# Patient Record
Sex: FEMALE | Race: WHITE | NOT HISPANIC OR LATINO | Employment: OTHER | URBAN - METROPOLITAN AREA
[De-identification: names, ages, dates, MRNs, and addresses within clinical notes are randomized per-mention and may not be internally consistent; named-entity substitution may affect disease eponyms.]

---

## 2022-04-11 ENCOUNTER — TELEPHONE (OUTPATIENT)
Dept: UROLOGY | Facility: AMBULATORY SURGERY CENTER | Age: 42
End: 2022-04-11

## 2022-04-11 NOTE — TELEPHONE ENCOUNTER
Please Triage  New Patient    What is the reason for the patients appointment? Kidney stones- [address]    What office location does the patient prefer? [place]     Imaging/Lab Results:    Do we accept the patient's insurance or is the patient Self-Pay? [address]  Provider:  Plan Type/Number:  Member ID#: Has the patient had any previous Urologist(s)? no    Have patient records been requested? If not are records showing in Epic: pt will have records faxed over     Has the patient had any outside testing done? US in ED     Does the patient have a personal history of cancer?  No    Pt call [facility]-[phone]

## 2022-04-11 NOTE — TELEPHONE ENCOUNTER
Patient was seen in the [address] [place] [address] on 4/9/22  Patient states she was having difficulty with urinating  Patient states blood work was done, urine testing   No imaging studies were preformed expect bedside ultrasound  Lopez catheter was placed  Was advised by ED to follow up with Urology for voiding trial  Patient insurance only allows her to go [facility]  location for urologist or [address] [place] [address]  The urologist in her area does not take her insurance  Patient states she had an ultrasound of the bladder and had a catheter placed  Patient states she had been trying to get records from [facility] for recent  Patient is willing to travel to [address]   Patient is requesting records  Will have records faxed to our office  Will monitor for records

## 2022-04-11 NOTE — TELEPHONE ENCOUNTER
Returned call to patient   No answer  Left general message on voice mail to return call to our office

## 2022-04-12 ENCOUNTER — TELEPHONE (OUTPATIENT)
Dept: UROLOGY | Facility: AMBULATORY SURGERY CENTER | Age: 42
End: 2022-04-12

## 2022-04-12 ENCOUNTER — TELEPHONE (OUTPATIENT)
Dept: UROLOGY | Facility: MEDICAL CENTER | Age: 42
End: 2022-04-12

## 2022-04-12 NOTE — TELEPHONE ENCOUNTER
Received and faxed to central faxing to place in patients chart and emailed a copy to [facility]

## 2022-04-12 NOTE — TELEPHONE ENCOUNTER
Schedule AP visit either in [facility] and void trial-next available (this week if possible)

## 2022-04-12 NOTE — TELEPHONE ENCOUNTER
Patient called about getting an appointment   She would like to schedule an appointment for a voiding trial      Patient can be reached at [phone]

## 2022-04-12 NOTE — TELEPHONE ENCOUNTER
Conversation  [facility])    [facility]    4/12/22 9:33 AM  Note  Records from [facility], [facility], faxed to [facility] and also faxed to [facility]

## 2022-04-12 NOTE — TELEPHONE ENCOUNTER
Records from [facility], [facility], faxed to [facility] and also faxed to [facility]

## 2022-04-13 NOTE — TELEPHONE ENCOUNTER
Called and spoke with patient  Patient scheduled 04/15/22 with Dr Mirna Mckenzie at 9:30am in the [facility] office  Patient will then come to the [address] [place] [address] office at 2:30 pm for a PVR

## 2022-04-15 ENCOUNTER — OFFICE VISIT (OUTPATIENT)
Dept: UROLOGY | Facility: HOSPITAL | Age: 42
End: 2022-04-15
Payer: COMMERCIAL

## 2022-04-15 VITALS
SYSTOLIC BLOOD PRESSURE: 122 MMHG | BODY MASS INDEX: 22.02 KG/M2 | OXYGEN SATURATION: 99 % | HEART RATE: 85 BPM | WEIGHT: 129 LBS | DIASTOLIC BLOOD PRESSURE: 88 MMHG | HEIGHT: 64 IN

## 2022-04-15 DIAGNOSIS — R33.9 URINARY RETENTION WITH INCOMPLETE BLADDER EMPTYING: Primary | ICD-10-CM

## 2022-04-15 LAB — POST-VOID RESIDUAL VOLUME, ML POC: 25 ML

## 2022-04-15 PROCEDURE — 99202 OFFICE O/P NEW SF 15 MIN: CPT | Performed by: UROLOGY

## 2022-04-15 PROCEDURE — 51798 US URINE CAPACITY MEASURE: CPT

## 2022-04-15 RX ORDER — LIOTHYRONINE SODIUM 5 UG/1
10 TABLET ORAL DAILY
COMMUNITY
Start: 2022-02-10

## 2022-04-15 RX ORDER — DULOXETIN HYDROCHLORIDE 60 MG/1
60 CAPSULE, DELAYED RELEASE ORAL DAILY
COMMUNITY
Start: 2022-04-06

## 2022-04-15 RX ORDER — FLUDROCORTISONE ACETATE 0.1 MG/1
0.1 TABLET ORAL DAILY
COMMUNITY
Start: 2022-03-23

## 2022-04-15 RX ORDER — BUPROPION HYDROCHLORIDE 300 MG/1
300 TABLET ORAL DAILY
COMMUNITY
Start: 2022-02-04

## 2022-04-15 RX ORDER — LEVOTHYROXINE SODIUM 0.1 MG/1
TABLET ORAL
COMMUNITY
Start: 2022-03-24

## 2022-04-15 RX ORDER — DIAZEPAM 2 MG/1
2 TABLET ORAL 4 TIMES DAILY
COMMUNITY
Start: 2022-02-08

## 2022-04-15 RX ORDER — RIZATRIPTAN BENZOATE 5 MG/1
TABLET ORAL
COMMUNITY
Start: 2022-02-11

## 2022-04-15 RX ORDER — DULOXETIN HYDROCHLORIDE 30 MG/1
30 CAPSULE, DELAYED RELEASE ORAL DAILY
COMMUNITY
Start: 2022-03-24

## 2022-04-15 NOTE — PROGRESS NOTES
HISTORY:    Urinary retention with ER visit six days ago, Lopez placed for large amount of residual     In terms of symptoms at that time, she does not recall any urinary voiding symptoms  However, diagnosed with herpes and mentions blisters around her vaginal introitus couple weeks before this event  Also recent diagnosis hyperthyroid, she reports being quite anxious, pressured, very tense muscles  ASSESSMENT / PLAN:  Lopez removed today  She will be checked by nurse this afternoon for to see how well she is emptying  If further retention, evaluation with urodynamics and cystoscopy  The following portions of the patient's history were reviewed and updated as appropriate: allergies, current medications, past family history, past medical history, past social history, past surgical history and problem list     Review of Systems   All other systems reviewed and are negative  Objective:     Physical Exam  Constitutional:       General: She is not in acute distress  Appearance: She is well-developed  She is not diaphoretic  HENT:      Head: Normocephalic and atraumatic  Eyes:      General: No scleral icterus  Pulmonary:      Effort: Pulmonary effort is normal    Skin:     Coloration: Skin is not pale  Neurological:      Mental Status: She is alert and oriented to person, place, and time  Psychiatric:         Behavior: Behavior normal          Thought Content: Thought content normal          Judgment: Judgment normal            No results found for: PSA]  No results found for: BUN  No results found for: CREATININE  No components found for: CBC      There is no problem list on file for this patient  Diagnoses and all orders for this visit:    Urinary retention with incomplete bladder emptying    Other orders  -     buPROPion (WELLBUTRIN XL) 300 mg 24 hr tablet; Take 300 mg by mouth daily  -     diazepam (VALIUM) 2 mg tablet;  Take 2 mg by mouth 4 (four) times a day  - DULoxetine (CYMBALTA) 30 mg delayed release capsule; Take 30 mg by mouth daily  -     fludrocortisone (FLORINEF) 0 1 mg tablet; Take 0 1 mg by mouth daily  -     levothyroxine 100 mcg tablet; TAKE 1 TABLET BY MOUTH EVERY DAY IN THE MORNING BEFORE BREAKFAST (Patient not taking: Reported on 4/15/2022)  -     liothyronine (CYTOMEL) 5 mcg tablet; Take 10 mcg by mouth daily (Patient not taking: Reported on 4/15/2022 )  -     rizatriptan (MAXALT) 5 MG tablet; PLEASE SEE ATTACHED FOR DETAILED DIRECTIONS  -     DULoxetine (CYMBALTA) 60 mg delayed release capsule; Take 60 mg by mouth daily           Patient ID: Lainey Horton is a 39 y o  female        Current Outpatient Medications:     buPROPion (WELLBUTRIN XL) 300 mg 24 hr tablet, Take 300 mg by mouth daily, Disp: , Rfl:     diazepam (VALIUM) 2 mg tablet, Take 2 mg by mouth 4 (four) times a day, Disp: , Rfl:     DULoxetine (CYMBALTA) 30 mg delayed release capsule, Take 30 mg by mouth daily, Disp: , Rfl:     DULoxetine (CYMBALTA) 60 mg delayed release capsule, Take 60 mg by mouth daily, Disp: , Rfl:     fludrocortisone (FLORINEF) 0 1 mg tablet, Take 0 1 mg by mouth daily, Disp: , Rfl:     rizatriptan (MAXALT) 5 MG tablet, PLEASE SEE ATTACHED FOR DETAILED DIRECTIONS, Disp: , Rfl:     levothyroxine 100 mcg tablet, TAKE 1 TABLET BY MOUTH EVERY DAY IN THE MORNING BEFORE BREAKFAST (Patient not taking: Reported on 4/15/2022), Disp: , Rfl:     liothyronine (CYTOMEL) 5 mcg tablet, Take 10 mcg by mouth daily (Patient not taking: Reported on 4/15/2022 ), Disp: , Rfl:     Past Medical History:   Diagnosis Date    Anxiety     Depression     Hypothyroidism     Lyme disease     Neurocardiogenic syncope        Past Surgical History:   Procedure Laterality Date    HYSTERECTOMY      WISDOM TOOTH EXTRACTION         Social History

## 2022-04-15 NOTE — PROGRESS NOTES
Patient's roe removed this morning  Patient has voided several times since removal   Patient's PVR 25 ml  Patient to return prn

## 2023-03-30 ENCOUNTER — OFFICE VISIT (OUTPATIENT)
Dept: GASTROENTEROLOGY | Facility: CLINIC | Age: 43
End: 2023-03-30

## 2023-03-30 VITALS
DIASTOLIC BLOOD PRESSURE: 80 MMHG | HEIGHT: 64 IN | WEIGHT: 135 LBS | BODY MASS INDEX: 23.05 KG/M2 | TEMPERATURE: 98.9 F | SYSTOLIC BLOOD PRESSURE: 120 MMHG

## 2023-03-30 DIAGNOSIS — K59.00 ACUTE CONSTIPATION: Primary | ICD-10-CM

## 2023-03-30 RX ORDER — SODIUM, POTASSIUM,MAG SULFATES 17.5-3.13G
1 SOLUTION, RECONSTITUTED, ORAL ORAL ONCE
Qty: 177 ML | Refills: 0 | Status: SHIPPED | OUTPATIENT
Start: 2023-03-30 | End: 2023-03-30

## 2023-03-30 RX ORDER — FAMCICLOVIR 500 MG/1
TABLET ORAL
COMMUNITY

## 2023-03-30 NOTE — PROGRESS NOTES
[facility] - Outpatient Consultation  Adele Zaldivar 43 y o  female MRN: [id number]  Encounter: [id number]          ASSESSMENT AND PLAN:      1  Acute constipation  2  Anal swelling    Differentials include hypertrophied anal papula, hemorrhoids, polyps, mass  Discussed with patient about evaluation with colonoscopy  Patient agreeable  Ordered bowel prep and procedure  Patient is allergic to polyethylene glycol containing compounds  Ordered Suprep  Patient would like to continue with current stool softener that she is taking  Encourage patient to control constipation with current bowel regimen  RTC 4 months  Martin Dunbar MD  Gastroenterology  [place] [address]  Date: March 30, 2023    - Na Sulfate-K Sulfate-Mg Sulf 17 5-3 13-1 6 GM/177ML SOLN; Take 1 kit by mouth once for 1 dose Colonoscopy prep  Dispense: 177 mL; Refill: 0  - Colonoscopy; Future    ______________________________________________________________________    HPI: 71-year-old with no known past medical history presents for evaluation  Patient states that she usually has regular bowel movements but for the last 1 week she has been having hard stools  She has been using stool softeners to have bowel movements  No blood in stools  She has noticed swelling in the anal area which has been more persistent and increased in size than in the past   No nausea, vomiting, swallowing problems, heartburn, abdominal pain or bloating  No pain on defecation  Weight stable  No family history of colon cancer  Labs done in 2022 show normal blood counts, renal function and liver enzymes  REVIEW OF SYSTEMS:    CONSTITUTIONAL: Denies any fever, chills, rigors, and weight loss  HEENT: No earache or tinnitus  Denies hearing loss or visual disturbances  CARDIOVASCULAR: No chest pain or palpitations     RESPIRATORY: Denies any cough, hemoptysis, shortness of breath or dyspnea on "exertion  GASTROINTESTINAL: As noted in the History of Present Illness  GENITOURINARY: No problems with urination  Denies any hematuria or dysuria  NEUROLOGIC: No dizziness or vertigo, denies headaches  MUSCULOSKELETAL: Denies any muscle or joint pain  SKIN: Denies skin rashes or itching  ENDOCRINE: Denies excessive thirst  Denies intolerance to heat or cold  PSYCHOSOCIAL: Denies depression or anxiety  Denies any recent memory loss  Historical Information   Past Medical History:   Diagnosis Date   • Anxiety    • Depression    • Hypothyroidism    • Lyme disease    • Neurocardiogenic syncope      Past Surgical History:   Procedure Laterality Date   • HYSTERECTOMY     • WISDOM TOOTH EXTRACTION       Social History   Social History     Substance and Sexual Activity   Alcohol Use Yes     Social History     Substance and Sexual Activity   Drug Use Never     Social History     Tobacco Use   Smoking Status Never   Smokeless Tobacco Never     Family History   Problem Relation Age of Onset   • Breast cancer Maternal Grandmother        Meds/Allergies       Current Outpatient Medications:   •  buPROPion (WELLBUTRIN XL) 300 mg 24 hr tablet  •  diazepam (VALIUM) 2 mg tablet  •  DULoxetine (CYMBALTA) 30 mg delayed release capsule  •  DULoxetine (CYMBALTA) 60 mg delayed release capsule  •  famciclovir (FAMVIR) 500 mg tablet  •  fludrocortisone (FLORINEF) 0 1 mg tablet  •  levothyroxine 100 mcg tablet  •  liothyronine (CYTOMEL) 5 mcg tablet  •  Na Sulfate-K Sulfate-Mg Sulf 17 5-3 13-1 6 GM/177ML SOLN  •  rizatriptan (MAXALT) 5 MG tablet    Allergies   Allergen Reactions   • Amoxicillin Rash   • Polyethylene Glycol 3350 Anxiety, Itching and Other (See Comments)   • Sulfa Antibiotics Rash           Objective     Blood pressure 120/80, temperature 98 9 °F (37 2 °C), temperature source Tympanic, height 5' 4\" (1 626 m), weight 61 2 kg (135 lb)  Body mass index is 23 17 kg/m²          PHYSICAL EXAM:      General " Appearance:   Alert, cooperative, no distress   HEENT:   Normocephalic, atraumatic, anicteric      Neck:  Supple, symmetrical, trachea midline   Lungs:   Clear to auscultation bilaterally; no rales, rhonchi or wheezing; respirations unlabored    Heart[de-identified]   Regular rate and rhythm; no murmur, rub, or gallop  Abdomen:   Soft, non-tender, non-distended; normal bowel sounds; no masses, no organomegaly    Genitalia:   Deferred    Rectal:   Deferred    Extremities:  No cyanosis, clubbing or edema    Pulses:  2+ and symmetric    Skin:  No jaundice, rashes, or lesions    Lymph nodes:  No palpable cervical lymphadenopathy        Lab Results:   No visits with results within 1 Day(s) from this visit  Latest known visit with results is:   Office Visit on 04/15/2022   Component Date Value   • POST-VOID RESIDUAL VOLUM* 04/15/2022 25          Radiology Results:   No results found

## 2023-03-30 NOTE — PATIENT INSTRUCTIONS
Scheduled date of colonoscopy (as of today):06/01/2023  Physician performing colonoscopy:Dr Holland  Location of colonoscopy:[place]  Bowel prep reviewed with patient:Suprep  Instructions reviewed with patient by:Unique  Clearances:  n/a

## 2023-05-26 ENCOUNTER — TELEPHONE (OUTPATIENT)
Dept: OTHER | Facility: OTHER | Age: 43
End: 2023-05-26

## 2023-05-26 NOTE — TELEPHONE ENCOUNTER
Patient called saying that her insurance is not covering her coming up coloscopy procedure on 6/1 and that they would need a prior authorization for out of net work facially, patient is asking if the office can give her a call back regarding the matter because she is not sure hat she needs to do

## 2023-05-26 NOTE — TELEPHONE ENCOUNTER
GI6 - COLONOSCOPY None  1 1   45378 (CPT®) - [place] COLONOSCOPY FLX DX W/COLLJ SPEC WHEN PFRMD None  1 1 45380 (CPT®) - [place] COLONOSCOPY W/BIOPSY SINGLE/MULTIPLE None  1 1 45381 (CPT®) - [place] COLSC FLX WITH DIRECTED SUBMUCOSAL NJX ANY SBST None  1 1 45385 (CPT®) - [place] COLSC FLX W/RMVL OF TUMOR POLYP LESION SNARE TQ None  1 1   I left patient a voicemail with above procedure codes as I am not sure which specific code they will be using   Dx: K59 [id number]

## 2023-05-26 NOTE — TELEPHONE ENCOUNTER
Received a call from the patient and long with Ivan Acevedo from [facility] needing dx and procedure codes for he upcoming procedure  Patient would like a call back from the office to requesting dx codes, procedure codes for upcoming colonoscopy scheduled for 6/1/23    This is needed in order to determine the patients out of pocket cost

## 2023-05-30 RX ORDER — SODIUM CHLORIDE 9 MG/ML
125 INJECTION, SOLUTION INTRAVENOUS CONTINUOUS
Status: CANCELLED | OUTPATIENT
Start: 2023-05-30

## 2023-06-01 ENCOUNTER — ANESTHESIA EVENT (OUTPATIENT)
Dept: GASTROENTEROLOGY | Facility: MEDICAL CENTER | Age: 43
End: 2023-06-01

## 2023-06-01 ENCOUNTER — PREP FOR PROCEDURE (OUTPATIENT)
Dept: GASTROENTEROLOGY | Facility: CLINIC | Age: 43
End: 2023-06-01

## 2023-06-01 ENCOUNTER — ANESTHESIA (OUTPATIENT)
Dept: GASTROENTEROLOGY | Facility: MEDICAL CENTER | Age: 43
End: 2023-06-01

## 2023-06-01 ENCOUNTER — HOSPITAL ENCOUNTER (OUTPATIENT)
Dept: GASTROENTEROLOGY | Facility: MEDICAL CENTER | Age: 43
Setting detail: OUTPATIENT SURGERY
End: 2023-06-01
Payer: COMMERCIAL

## 2023-06-01 VITALS
TEMPERATURE: 97.7 F | SYSTOLIC BLOOD PRESSURE: 121 MMHG | BODY MASS INDEX: 23.05 KG/M2 | RESPIRATION RATE: 20 BRPM | WEIGHT: 135 LBS | DIASTOLIC BLOOD PRESSURE: 63 MMHG | OXYGEN SATURATION: 100 % | HEART RATE: 57 BPM | HEIGHT: 64 IN

## 2023-06-01 DIAGNOSIS — K59.00 ACUTE CONSTIPATION: ICD-10-CM

## 2023-06-01 PROBLEM — E03.9 HYPOTHYROIDISM: Status: ACTIVE | Noted: 2023-06-01

## 2023-06-01 PROBLEM — G43.909 MIGRAINES: Status: ACTIVE | Noted: 2023-06-01

## 2023-06-01 PROBLEM — R55 NEUROCARDIOGENIC SYNCOPE: Status: ACTIVE | Noted: 2023-06-01

## 2023-06-01 PROBLEM — A69.20 LYME DISEASE: Status: ACTIVE | Noted: 2023-06-01

## 2023-06-01 PROCEDURE — 88305 TISSUE EXAM BY PATHOLOGIST: CPT | Performed by: PATHOLOGY

## 2023-06-01 RX ORDER — LIDOCAINE HYDROCHLORIDE 20 MG/ML
INJECTION, SOLUTION EPIDURAL; INFILTRATION; INTRACAUDAL; PERINEURAL AS NEEDED
Status: DISCONTINUED | OUTPATIENT
Start: 2023-06-01 | End: 2023-06-01

## 2023-06-01 RX ORDER — PROPOFOL 10 MG/ML
INJECTION, EMULSION INTRAVENOUS AS NEEDED
Status: DISCONTINUED | OUTPATIENT
Start: 2023-06-01 | End: 2023-06-01

## 2023-06-01 RX ORDER — SODIUM CHLORIDE 9 MG/ML
125 INJECTION, SOLUTION INTRAVENOUS CONTINUOUS
Status: DISCONTINUED | OUTPATIENT
Start: 2023-06-01 | End: 2023-06-05 | Stop reason: HOSPADM

## 2023-06-01 RX ADMIN — PROPOFOL 50 MG: 10 INJECTION, EMULSION INTRAVENOUS at 10:58

## 2023-06-01 RX ADMIN — PROPOFOL 50 MG: 10 INJECTION, EMULSION INTRAVENOUS at 10:51

## 2023-06-01 RX ADMIN — PROPOFOL 50 MG: 10 INJECTION, EMULSION INTRAVENOUS at 10:40

## 2023-06-01 RX ADMIN — PROPOFOL 120 MG: 10 INJECTION, EMULSION INTRAVENOUS at 10:36

## 2023-06-01 RX ADMIN — SODIUM CHLORIDE 125 ML/HR: 0.9 INJECTION, SOLUTION INTRAVENOUS at 10:14

## 2023-06-01 RX ADMIN — PROPOFOL 50 MG: 10 INJECTION, EMULSION INTRAVENOUS at 10:46

## 2023-06-01 RX ADMIN — LIDOCAINE HYDROCHLORIDE 5 ML: 20 INJECTION, SOLUTION EPIDURAL; INFILTRATION; INTRACAUDAL at 10:36

## 2023-06-01 RX ADMIN — PROPOFOL 50 MG: 10 INJECTION, EMULSION INTRAVENOUS at 10:44

## 2023-06-01 NOTE — ANESTHESIA PREPROCEDURE EVALUATION
Procedure:  COLONOSCOPY    Relevant Problems   CARDIO   (+) Migraines      ENDO   (+) Hypothyroidism      NEURO/PSYCH   (+) Migraines      Other   (+) Lyme disease   (+) Neurocardiogenic syncope        Physical Exam    Airway    Mallampati score: II  TM Distance: >3 FB  Neck ROM: full     Dental   No notable dental hx     Cardiovascular  Cardiovascular exam normal    Pulmonary  Pulmonary exam normal     Other Findings        Anesthesia Plan  ASA Score- 2     Anesthesia Type- IV sedation with anesthesia with ASA Monitors  Additional Monitors:   Airway Plan:           Plan Factors-Exercise tolerance (METS): >4 METS  Chart reviewed  Patient is not a current smoker  Patient instructed to abstain from smoking on day of procedure  Patient did not smoke on day of surgery  Obstructive sleep apnea risk education given perioperatively  Induction- intravenous  Postoperative Plan-     Informed Consent- Anesthetic plan and risks discussed with patient

## 2023-06-01 NOTE — H&P
"History and Physical -  [facility]  Sanna Chavez 43 y o  female MRN: [id number]                  HPI: Sanna Chavez is a 43y o  year old female who presents for colonoscopy to investigate acute constipation and anal swelling  REVIEW OF SYSTEMS: Per the HPI, and otherwise unremarkable  Historical Information   Past Medical History:   Diagnosis Date   • Anxiety    • Depression    • Hypothyroidism    • Lyme disease    • Neurocardiogenic syncope      Past Surgical History:   Procedure Laterality Date   • HYSTERECTOMY     • WISDOM TOOTH EXTRACTION       Social History   Social History     Substance and Sexual Activity   Alcohol Use Yes     Social History     Substance and Sexual Activity   Drug Use Never     Social History     Tobacco Use   Smoking Status Never   Smokeless Tobacco Never     Family History   Problem Relation Age of Onset   • Breast cancer Maternal Grandmother        Meds/Allergies     (Not in a hospital admission)      Allergies   Allergen Reactions   • Amoxicillin Rash   • Polyethylene Glycol 3350 Anxiety, Itching and Other (See Comments)   • Sulfa Antibiotics Rash       Objective     Blood pressure 154/98, pulse 61, temperature 97 7 °F (36 5 °C), temperature source Temporal, resp  rate 16, height 5' 4\" (1 626 m), weight 61 2 kg (135 lb), SpO2 100 %  PHYSICAL EXAM    Gen: NAD  CV: RRR  CHEST: Clear  ABD: soft, NT/ND  EXT: no edema      ASSESSMENT/PLAN:  Sanna Chavez is a 43y o  year old female who presents for colonoscopy to investigate acute constipation and anal swelling  The patient is stable and optimized for the procedure, we reviewed risk and benefits  Risk include but not limited to infection, bleeding, perforation and missing a lesion          "

## 2023-06-01 NOTE — ANESTHESIA POSTPROCEDURE EVALUATION
"Post-Op Assessment Note    CV Status:  Stable    Pain management: adequate     Mental Status:  Alert and awake   Hydration Status:  Euvolemic   PONV Controlled:  Controlled   Airway Patency:  Patent      Post Op Vitals Reviewed: Yes            No notable events documented      BP      Temp      Pulse     Resp      SpO2      /63   Pulse 57   Temp 97 7 °F (36 5 °C) (Temporal)   Resp 20   Ht 5' 4\" (1 626 m)   Wt 61 2 kg (135 lb)   SpO2 100%   BMI 23 17 kg/m²     "

## 2023-06-06 PROCEDURE — 88305 TISSUE EXAM BY PATHOLOGIST: CPT | Performed by: PATHOLOGY

## 2023-11-15 ENCOUNTER — TELEPHONE (OUTPATIENT)
Dept: GASTROENTEROLOGY | Facility: CLINIC | Age: 43
End: 2023-11-15